# Patient Record
Sex: FEMALE | Race: WHITE
[De-identification: names, ages, dates, MRNs, and addresses within clinical notes are randomized per-mention and may not be internally consistent; named-entity substitution may affect disease eponyms.]

---

## 2020-03-04 ENCOUNTER — HOSPITAL ENCOUNTER (EMERGENCY)
Dept: HOSPITAL 56 - MW.ED | Age: 40
Discharge: HOME | End: 2020-03-04
Payer: COMMERCIAL

## 2020-03-04 VITALS — DIASTOLIC BLOOD PRESSURE: 80 MMHG | SYSTOLIC BLOOD PRESSURE: 114 MMHG | HEART RATE: 71 BPM

## 2020-03-04 DIAGNOSIS — R00.0: ICD-10-CM

## 2020-03-04 DIAGNOSIS — B34.9: Primary | ICD-10-CM

## 2020-03-04 DIAGNOSIS — F17.210: ICD-10-CM

## 2020-03-04 DIAGNOSIS — J06.9: ICD-10-CM

## 2020-03-04 LAB
BUN SERPL-MCNC: 9 MG/DL (ref 7–18)
CHLORIDE SERPL-SCNC: 105 MMOL/L (ref 98–107)
CO2 SERPL-SCNC: 23.8 MMOL/L (ref 21–32)
GLUCOSE SERPL-MCNC: 92 MG/DL (ref 74–106)
POTASSIUM SERPL-SCNC: 3.8 MMOL/L (ref 3.5–5.1)
SODIUM SERPL-SCNC: 139 MMOL/L (ref 136–145)

## 2020-03-04 PROCEDURE — 96374 THER/PROPH/DIAG INJ IV PUSH: CPT

## 2020-03-04 PROCEDURE — 71046 X-RAY EXAM CHEST 2 VIEWS: CPT

## 2020-03-04 PROCEDURE — 99285 EMERGENCY DEPT VISIT HI MDM: CPT

## 2020-03-04 PROCEDURE — 80053 COMPREHEN METABOLIC PANEL: CPT

## 2020-03-04 PROCEDURE — 82550 ASSAY OF CK (CPK): CPT

## 2020-03-04 PROCEDURE — 85379 FIBRIN DEGRADATION QUANT: CPT

## 2020-03-04 PROCEDURE — 84484 ASSAY OF TROPONIN QUANT: CPT

## 2020-03-04 PROCEDURE — 84703 CHORIONIC GONADOTROPIN ASSAY: CPT

## 2020-03-04 PROCEDURE — 87804 INFLUENZA ASSAY W/OPTIC: CPT

## 2020-03-04 PROCEDURE — 96361 HYDRATE IV INFUSION ADD-ON: CPT

## 2020-03-04 PROCEDURE — 93005 ELECTROCARDIOGRAM TRACING: CPT

## 2020-03-04 PROCEDURE — 85025 COMPLETE CBC W/AUTO DIFF WBC: CPT

## 2020-03-04 NOTE — EDM.PDOC
ED HPI GENERAL MEDICAL PROBLEM





- General


Chief Complaint: General


Stated Complaint: VIRUS


Time Seen by Provider: 03/04/20 17:41





- History of Present Illness


INITIAL COMMENTS - FREE TEXT/NARRATIVE: 





HPI


39-year-old female smoker presents for evaluation of cough, subjective 

shortness of breath, and subjective fevers that is been present for 

approximately 3 days and occurred after recent trip to [place] (where 2019-nCoV 

is tentatively believed to be endemic), denies history of DVT, PE, recent calf 

tenderness or swelling, notes that she took NSAIDs approximately 3 hours prior 

to presentation. Patient is concerned that she has COVID-19 and was sent by her 

boss for evaluation. Patient is currently taking penicillin for a dental 

abscess.





M/S/F/SocHx notable for: please see HPI; remainder reviewed with patient and in 

chart. 





ROS: Negative constitutional, eye, cardiovascular, pulmonary, GI, , MSK, skin

, neurologic, psychiatric, endocrine unless noted in the HPI.





Exam


, RR 18, /103, T 36.3C, SaO2 9 & room air.


Gen: pleasant, nontoxic-appearing, appears to be mildly uncomfortable.


HEENT: NC, AT, PEERL, EOMI.


Resp: Clear to auscultation bilaterally, normal work of breathing, no accessory 

muscle usage.


Card: Regular rate and rhythm with no murmurs, rubs, or gallops, extremities 

warm and well perfused. 


GI: nontender to palpation, no rebound, no guarding.


: No suprapubic tenderness to palpation.


MSK: No visible deformities, strength and tone without visually appreciable 

deficit.


Skin: Normal color with no visible lesions.


Neuro: alert and oriented  3, no facial asymmetry, vision and hearing WNL.


Psych: Mood and affect appropriate.





Labs / Imaging:


EKG: SR 69 bpm, no HI segment depressions,  ms, QRS 12 ms, no ST segment 

elevations or depressions, no discordant T wave inversions, QTc 403 ms..


CBC, CMP, CK, d-dimer, troponin, influenza A & B, and hCG pending.


CXR: pending. 





MDM


Previous chart, nursing note, labs, imaging, and vitals reviewed. 


A: 39-year-old female smoker presents for evaluation of cough, subjective 

shortness of breath, and subjective fevers that is been present for 

approximately 3 days and occurred after recent trip to [place] (where 2019-nCo 

is tentatively believed to be endemic), denies history of DVT, PE, recent calf 

tenderness or swelling, notes that she took NSAIDs approximately 3 hours prior 

to presentation.





DDx: URI (viral versus bacterial), dehydration, electrolyte abnormalities, 

pericarditis, myocarditis, PE, pericardial effusion, pleural effusion, pneumonia

, occult septicemia.





Evaluation: patient with a broad differential but clinically with her upper 

respiratory tract infection as the most likely diagnosis. Suspect tachycardia 

secondary to dehydration, however this is tentative pending completion of 

evaluation.





ED Course: 1 L NS and 15 mg Toradol given for symptomatic treatment of myalgias 

as well as suspected dehydration.





Disposition: care transferred to the [facility] overnight provider.





Impression: cough, malaise, tachycardia.





- Related Data


 Allergies











Allergy/AdvReac Type Severity Reaction Status Date / Time


 


No Known Allergies Allergy   Verified 03/04/20 17:46











Home Meds: 


 Home Meds





. [No Known Home Meds]  03/04/20 [History]











Past Medical History


HEENT History: Reports: None


Cardiovascular History: Reports: Other (See Below)


Other Cardiovascular History: mitral valve prolapse


Respiratory History: Reports: None


Gastrointestinal History: Reports: None


Genitourinary History: Reports: None


OB/GYN History: Reports: None


Musculoskeletal History: Reports: None


Neurological History: Reports: None


Psychiatric History: Reports: None


Endocrine/Metabolic History: Reports: None


Hematologic History: Reports: None


Immunologic History: Reports: None


Oncologic (Cancer) History: Reports: None


Dermatologic History: Reports: None





- Infectious Disease History


Infectious Disease History: Reports: None





- Past Surgical History


Head Surgeries/Procedures: Reports: None


HEENT Surgical History: Reports: Oral Surgery, Tonsillectomy


Cardiovascular Surgical History: Reports: None


Respiratory Surgical History: Reports: None


GI Surgical History: Reports: None


Female  Surgical History: Reports: None


Endocrine Surgical History: Reports: None


Neurological Surgical History: Reports: None


Musculoskeletal Surgical History: Reports: None


Oncologic Surgical History: Reports: None


Dermatological Surgical History: Reports: None





Social & Family History





- Family History


Family Medical History: Noncontributory





- Tobacco Use


Smoking Status *Q: Current Every Day Smoker


Years of Tobacco use: 30


Packs/Tins Daily: 0.5


Second Hand Smoke Exposure: No





- Caffeine Use


Caffeine Use: Reports: Coffee





- Recreational Drug Use


Recreational Drug Use: Yes


Recreational Drug Type: Reports: Marijuana/Hashish


Recreational Drug Use Frequency: Binges





ED ROS GENERAL





- Review of Systems


Review Of Systems: See Below





ED EXAM, GENERAL





- Physical Exam


Exam: See Below





Course





- Vital Signs


Last Recorded V/S: 


 Last Vital Signs











Temp  36.1 C   03/04/20 18:40


 


Pulse  71   03/04/20 18:40


 


Resp  16   03/04/20 18:40


 


BP  114/80   03/04/20 18:40


 


Pulse Ox  100   03/04/20 18:40














- Orders/Labs/Meds


Orders: 


 Active Orders 24 hr











 Category Date Time Status


 


 EKG 12 Lead [EKG Documentation Completion] [RC] STAT Care  03/04/20 18:02 

Active


 


 CMP [COMPREHENSIVE METABOLIC PN,CMP] [CHEM] Stat Lab  03/04/20 18:35 Received


 


 CREATINE KINASE,CK [CHEM] Stat Lab  03/04/20 18:35 Received


 


 D Dimer [D-DIMER QUANTITATIVE] [COAG] Stat Lab  03/04/20 18:35 Received


 


 HCG QUALITATIVE,SERUM [CHEM] Stat Lab  03/04/20 18:35 Received


 


 INFLUENZA A+B AG SCREEN [RM] Stat Lab  03/04/20 18:35 Received


 


 TROPONIN I [CHEM] Stat Lab  03/04/20 18:35 Received











Labs: 


 Laboratory Tests











  03/04/20 Range/Units





  18:35 


 


WBC  7.83  (4.0-11.0)  K/uL


 


RBC  4.68  (4.30-5.90)  M/uL


 


Hgb  15.0  (12.0-16.0)  g/dL


 


Hct  44.3  (36.0-46.0)  %


 


MCV  94.7  (80.0-98.0)  fL


 


MCH  32.1 H  (27.0-32.0)  pg


 


MCHC  33.9  (31.0-37.0)  g/dL


 


RDW Std Deviation  41.9  (28.0-62.0)  fl


 


RDW Coeff of Arnoldo  12  (11.0-15.0)  %


 


Plt Count  277  (150-400)  K/uL


 


MPV  9.80  (7.40-12.00)  fL


 


Neut % (Auto)  52.0  (48.0-80.0)  %


 


Lymph % (Auto)  36.3  (16.0-40.0)  %


 


Mono % (Auto)  9.5  (0.0-15.0)  %


 


Eos % (Auto)  1.8  (0.0-7.0)  %


 


Baso % (Auto)  0.4  (0.0-1.5)  %


 


Neut # (Auto)  4.1  (1.4-5.7)  K/uL


 


Lymph # (Auto)  2.8 H  (0.6-2.4)  K/uL


 


Mono # (Auto)  0.7  (0.0-0.8)  K/uL


 


Eos # (Auto)  0.1  (0.0-0.7)  K/uL


 


Baso # (Auto)  0.0  (0.0-0.1)  K/uL


 


Nucleated RBC %  0.0  /100WBC


 


Nucleated RBCs #  0  K/uL











Meds: 


Medications














Discontinued Medications














Generic Name Dose Route Start Last Admin





  Trade Name Freq  PRN Reason Stop Dose Admin


 


Sodium Chloride  1,000 mls @ 1,000 mls/hr  03/04/20 18:03  03/04/20 18:37





  Normal Saline  IV  03/04/20 19:02  1,000 mls/hr





  .Bolus ONE   Administration





     





     





     





     


 


Ketorolac Tromethamine  15 mg  03/04/20 18:03  03/04/20 18:37





  Toradol  IVPUSH  03/04/20 18:04  15 mg





  ONETIME ONE   Administration





     





     





     





     














Departure





- Departure


Time of Disposition: 19:05


Disposition: Still A Patient 30


Clinical Impression: 


 URI (upper respiratory infection)








- Discharge Information


Referrals: 


PCP,None [Primary Care Provider] - 


Forms:  ED Department Discharge


Additional Instructions: 


You were in seen in the [facility] 

Emergency Department for evaluation of a cough and feeling unwell. At time of 

your evaluation you are suspected have an upper respiratory tract infection 

likely caused by a virus. Please stay well-hydrated, please use ibuprofen and 

acetaminophen as directed below for treatment of discomfort. Please remain at 

home for 14 days until your symptoms are fully resolved. Please read and follow 

all of the instructions below.





Please follow up with your primary care physician as needed. When calling for 

follow-up care, please make the office aware that this follow-up is from your 

recent emergency room visit. If for any reason you are refused follow-up, 

please contact the [facility] Emergency 

Department at [phone] and asked to speak to the emergency department 

charge nurse. 





Your care today was limited to identifying and treating emergent medical 

problems only. Many people have subtle differences in their test results that 

require follow up with their outpatient physician(s) to correctly determine if 

this represents a normal variation or concerning abnormality with respect to 

your specific health. The care given to you today was limited to identifying 

and treating emergent medical problems - you need to request a copy of all of 

your medical records from today's visit and follow up with your outpatient 

physician(s) to review both today's visit and your overall health. If you have 

any new symptoms or if you are at all concerned about your health please return 

immediately to the emergency department.





Prescriptions:


If you are uninsured or have financial difficulties with filling your 

prescription(s), you may consider using a free pharmacy discount service such 

as [employer] ([employer]) or [employer] (wali). These services allow 

you to search for a medication on your phone (or computer) and obtain a coupon 

that usually has a significant discount from the list price at a pharmacy. Your 

physician as well as [facility] does not have a financial 

relationship with either of these services. You may also wish to speak with 

your physician to determine if lower cost prescriptions are possible.





Obtaining primary care:


1.   [facility] provides pediatrics (children), 

family medicine (children, adults, and some obstetrical care), and internal 

medicine (adults). Further specialty care is also available. Same day 

appointments are available. They may be contacted at [phone] and are open 

Monday through Friday 8 AM to 5 PM. The [facility] are 

located at [facility], [address] [place] [address].


2.   [facility] offers family medicine, internal 

medicine, womens health, and further specialty care. [facility] 

may be contacted at [phone]. [facility] is 

located at [address] [place] [address], [address].


3.   If you have health insurance, please also contact your insurer for a list 

of accepting providers under your policy, you may contact these providers for 

further health care.





Occupational health:


Work related injuries may consider following up with [facility], [phone]. Occupational health services are located at [address] [place] [address] and are open Monday through Friday from 7:

30 am to 5:00 pm.





Obstetrical and Gynecological Care:


[facility], [phone], Monday through Friday 8 AM to 

5 PM. [address] [address].





Eyecare:


If you have an eye injury you should follow up with your ophthalmologist or 

with [facility], at [phone] or [phone]

, they are located at [address] [place] [address].





Dental Care 


   [facility]. [address] [place], [place]. Ph. [phone]


   Swapnil [facility]. [address] [facility] [address], [place], [place]. Ph. [phone]


   Goyo [facility]ANT [facility]. [address] [place] [address]. Ph. [phone]


   Yaniv Marcus [facility]. [address] [facility] [address] [place]. Ph. [phone]


   [facility] [address] [facility] [address] [place], [place]. Ph. [phone]


   Bhakti GAMA [facility]. [address] [place] [address]. Ph. [phone]


   [facility]. [address] [place], [place]. Ph. [phone]


   [facility]. [address], [place], [place]. 

Ph. [phone]


   [facility] [address] [place] [address]. Ph. [phone]


   [facility]. [address] [place] [address] [address] [place], [place]. Ph. [phone]


[facility]. [address] [place], [place]. Ph. [phone]


Viral Syndrome


You are believed to have a viral infection of the respiratory tract. These 

infections may cause chills, fever, cough, headache, body aches, and sore 

throat. Depending upon the virus, you may have mild to more severe symptoms. 

The worst symptoms typically last a 2-5 days. Cough and fatigue may continue 

for as long as 7 to 10 days. Viral respiratory infections are highly 

contagious. Symptoms will not be reduced or improved by taking an antibiotic. 

Antibiotics are medications that kill bacteria, not viruses. Rarely these 

infections can lead to an infection of the sinuses, lungs, or middle ear. 

However antibiotics at this point in your illness antibiotics will not help 

prevent these uncommon complications. 





Home Care Instructions: 


* Care for viral infections will not shorten your illness but can help reduce 

your symptoms.


* Please stay well hydrated and attempt to get plently of sleep.


* You may take both ibuprofen and acetaminophen as directed on the bottle for 

relief of fever, chills, and muscle aches. 


* If you have a severe cough you may take an over-the-counter cough medication 

containing dextromethorphan.


* Continue to cover your cough and wash your hands often.


* Read the package instructions and warnings on any medication that you are 

taking.





Return to the Emergency Department if you have:


* Fast breathing, trouble breathing, or shortness of breath


* Bluish or gray skin color 


* Not drinking enough fluids 


* Severe or persistent vomiting 


* Not waking up or not interacting 


* Pain or pressure in the chest or abdomen 


* Sudden dizziness 


* Confusion 


* Or if you are otherwise concerned about your health





Please follow-up your primary care provider or return to the emergency 

department if:


* Your symptoms fail to improve over the next 4-5 days.


* Your symptoms improve but then significantly worsen - this may be a sign of a 

bacterial infection which will need to be treated.


You are otherwise concerned about your health.


You make take over the counter Acetaminophen (Tylenol) and Ibuprofen (Motrin or 

Aleve) as directed below for relief of pain. 


   Take 600 mg of ibuprofen (three 200 mg tablets) with a glass of water every 

6-8 hours as needed for pain or fever. Do not take if you have ulcers, GI 

bleeding, are pregnant, or are allergic to ibuprofen.


   Take 1,000 mg of acetaminophen (two 500 mg tablets) with a glass of water 

every 6-8 hours as needed for pain. Do not take if you are allergic to 

acetaminophen. If you have liver disease, please reduce your dose to a maximum 

of 2,000 mg per day.


   You can take these medications at the same time or on separate schedules. 


   Do not take for more than 10 days.


   Do not take with alcohol or other acetaminophen containing medications.  


   This medication may cause a mildly upset stomach, if so take it with a 

small snack. Stop taking it if you have persistent abdominal pain, heartburn, 

or any stomach pain. Do not take this medication if you have known ulcers. 


   Please read the warnings at the end of this document regarding these 

medications. 





IBUPROFEN WARNING: This drug may infrequently cause serious (rarely fatal) 

bleeding from the stomach or intestines. Also, related drugs rarely have caused 

blood clots to form, resulting in heart attacks and strokes. This medication 

might also rarely cause similar problems. Talk to your doctor or pharmacist 

about the benefits and risks of treatment, as well as other possible medication 

choices. If you notice any of the following rare but very serious side effects, 

stop taking ibuprofen and seek immediate medical attention: black stools, 

persistent stomach/abdominal pain, vomit that looks like coffee grounds, chest 

pain, weakness on one side of the body, sudden vision changes, slurred speech. 





IBUPROFEN SIDE EFFECTS: Upset stomach, nausea, vomiting, heartburn, headache, 

diarrhea, constipation, drowsiness, and dizziness may occur. If any of these 

effects persist or worsen, notify your doctor or pharmacist promptly. If your 

doctor has directed you to use this medication, remember that he or she has 

judged that the benefit to you is greater than the risk of side effects. Many 

people using this medication do not have serious side effects. Tell your doctor 

immediately if any of these serious side effects occur: stomach pain, swelling 

of the hands or feet, sudden or unexplained weight gain, ringing in the ears (

tinnitus). Tell your doctor immediately if any of these unlikely but serious 

side effects occur: vision changes, rapid or pounding heartbeat, easy bruising 

or bleeding, difficult/painful swallowing. Tell your doctor immediately if any 

of these highly unlikely but very serious side effects occur: change in amount 

of urine, severe headache, very stiff neck, mental/mood changes, persistent 

sore throat or fever. This drug may rarely cause serious (possibly fatal) liver 

disease. If you notice any of the following highly unlikely but very serious 

side effects, stop taking ibuprofen and consult your doctor or pharmacist 

immediately: yellowing eyes and skin, dark urine, unusual/extreme tiredness. An 

allergic reaction to this drug is unlikely, but seek immediate medical 

attention if it occurs. Symptoms of an allergic reaction include: rash, itching/

swelling (especially of the face/tongue/throat), severe dizziness, trouble 

breathing. This is not a complete list of possible side effects. 





ACETAMINOPHEN SIDE EFFECTS: This drug usually has no side effects. If you do 

not have liver problems, the maximum dose of acetaminophen for adults is 4 

grams per day (4000 milligrams). Taking more than the maximum daily amount may 

cause serious (possibly fatal) liver damage. Get medical help right away if you 

have any of the following symptoms of liver damage: persistent nausea/vomiting, 

extreme tiredness, stomach/abdominal pain, yellowing eyes/skin, dark urine. If 

you have liver problems, consult your doctor or pharmacist for a safe dosage of 

this medication. A very serious allergic reaction to this drug is rare. However

, get medical help right away if you notice any symptoms of a serious allergic 

reaction, including: rash, itching/swelling (especially of the face/tongue/

throat), severe dizziness, trouble breathing. This is not a complete list of 

possible side effects. If you notice other effects not listed above, contact 

your doctor or pharmacist.





DRUG INTERACTIONS: Your healthcare professionals (e.g., doctor or pharmacist) 

may already be aware of any possible drug interactions and may be monitoring 

you for it. Do not start, stop or change the dosage of any medicine before 

checking with them first. This drug should not be used with the following 

medications because very serious interactions may occur: cidofovir, ketorolac. 

If you are currently using any of these medications listed above, tell your 

doctor or pharmacist before starting ibuprofen. Before using this medication, 

tell your doctor or pharmacist of all prescription and nonprescription/herbal 

products you may use, especially of: anti-platelet drugs (e.g., cilostazol, 

clopidogrel), oral bisphosphonates (e.g., alendronate), other medications for 

arthritis (e.g., aspirin, methotrexate), "blood thinners" (e.g., enoxaparin, 

heparin, warfarin), corticosteroids (e.g., prednisone), cyclosporine, 

desmopressin, high blood pressure drugs (including ACE inhibitors such as 

captopril, angiotensin II receptor antagonists such as losartan, and beta-

blockers such as metoprolol), lithium, pemetrexed, "water pills" (diuretics 

such as furosemide, hydrochlorothiazide, triamterene). Check all prescription 

and nonprescription medicine labels carefully for other pain/fever drugs (

NSAIDs such as aspirin, celecoxib, naproxen). These drugs are similar to 

ibuprofen, so taking one of these drugs while also taking ibuprofen may 

increase your risk of side effects. Consult your doctor or pharmacist for more 

details. However, if your doctor has prescribed low doses of aspirin to prevent 

heart attack or stroke (usually at dosages of  milligrams a day), you 

should continue to take the aspirin. Daily use of ibuprofen may decrease aspirin

's ability to prevent heart attack/stroke. Talk to your doctor about using a 

different medication (e.g., acetaminophen) to treat pain/fever. If you must 

take ibuprofen, talk to your doctor about possibly taking immediate-release 

aspirin (not enteric-coated) while also taking the ibuprofen dose apart from 

your aspirin dose. Do not increase your daily dose of aspirin or change the way 

you take aspirin/other medications without your doctor's approval. This 

document does not contain all possible interactions. Therefore, before using 

this product, tell your doctor or pharmacist of all the products you use. Keep 

a list of all your medications with you, and share the list with your doctor 

and pharmacist. 





Sepsis Event Note





- Evaluation


Sepsis Screening Result: No Definite Risk





- Focused Exam


Vital Signs: 


 Vital Signs











  Temp Pulse Resp BP Pulse Ox


 


 03/04/20 18:40  36.1 C  71  16  114/80  100


 


 03/04/20 17:44  36.3 C  131 H  18  143/103 H  99











Date Exam was Performed: 03/04/20


Time Exam was Performed: 19:05





- My Orders


Last 24 Hours: 


My Active Orders





03/04/20 18:02


EKG 12 Lead [EKG Documentation Completion] [RC] STAT 





03/04/20 18:35


CMP [COMPREHENSIVE METABOLIC PN,CMP] [CHEM] Stat 


CREATINE KINASE,CK [CHEM] Stat 


D Dimer [D-DIMER QUANTITATIVE] [COAG] Stat 


HCG QUALITATIVE,SERUM [CHEM] Stat 


INFLUENZA A+B AG SCREEN [RM] Stat 


TROPONIN I [CHEM] Stat 














- Assessment/Plan


Last 24 Hours: 


My Active Orders





03/04/20 18:02


EKG 12 Lead [EKG Documentation Completion] [RC] STAT 





03/04/20 18:35


CMP [COMPREHENSIVE METABOLIC PN,CMP] [CHEM] Stat 


CREATINE KINASE,CK [CHEM] Stat 


D Dimer [D-DIMER QUANTITATIVE] [COAG] Stat 


HCG QUALITATIVE,SERUM [CHEM] Stat 


INFLUENZA A+B AG SCREEN [RM] Stat 


TROPONIN I [CHEM] Stat

## 2020-03-04 NOTE — CR
Chest: 2 views of the chest were obtained.

 

Comparison: Prior chest x-ray of 02/04/14.

 

Heart size and mediastinum are normal.  Lungs are clear with no acute 

parenchymal change.  Minimal scoliosis is noted.  Plate and screws are

 partially visualized within the right humerus.

 

Nodular density noted within the right upper chest measuring 2.6 cm.  

This is not identified on prior exam but is most likely due to 

external artifact.

 

Impression:

1.  Findings as noted above.

2.  Nothing acute is appreciated on 2 view chest x-ray.

 

Diagnostic code #2

 

Study was dictated in [place] Standard Time

## 2020-03-04 NOTE — EDM.PDOC
ED HPI GENERAL MEDICAL PROBLEM





- General


Chief Complaint: General


Stated Complaint: VIRUS


Time Seen by Provider: 03/04/20 17:41





- History of Present Illness


INITIAL COMMENTS - FREE TEXT/NARRATIVE: 





HPI


39-year-old female smoker presents for evaluation of cough, subjective 

shortness of breath, and subjective fevers that is been present for 

approximately 3 days and occurred after recent trip to [place] (where 2019-nCoV 

is tentatively believed to be endemic), denies history of DVT, PE, recent calf 

tenderness or swelling, notes that she took NSAIDs approximately 3 hours prior 

to presentation. Patient is concerned that she has COVID-19 and was sent by her 

boss for evaluation. Patient is currently taking penicillin for a dental 

abscess.





M/S/F/SocHx notable for: please see HPI; remainder reviewed with patient and in 

chart. 





ROS: Negative constitutional, eye, cardiovascular, pulmonary, GI, , MSK, skin

, neurologic, psychiatric, endocrine unless noted in the HPI.





Exam


, RR 18, /103, T 36.3C, SaO2 9 & room air.


Gen: pleasant, nontoxic-appearing, appears to be mildly uncomfortable.


HEENT: NC, AT, PEERL, EOMI.


Resp: Clear to auscultation bilaterally, normal work of breathing, no accessory 

muscle usage.


Card: Regular rate and rhythm with no murmurs, rubs, or gallops, extremities 

warm and well perfused. 


GI: nontender to palpation, no rebound, no guarding.


: No suprapubic tenderness to palpation.


MSK: No visible deformities, strength and tone without visually appreciable 

deficit.


Skin: Normal color with no visible lesions.


Neuro: alert and oriented  3, no facial asymmetry, vision and hearing WNL.


Psych: Mood and affect appropriate.





Labs / Imaging:


EKG: SR 69 bpm, no IN segment depressions,  ms, QRS 12 ms, no ST segment 

elevations or depressions, no discordant T wave inversions, QTc 403 ms..


CBC, CMP, CK, d-dimer, troponin, influenza A & B, and hCG pending.


CXR: pending. 





MDM


Previous chart, nursing note, labs, imaging, and vitals reviewed. 


A: 39-year-old female smoker presents for evaluation of cough, subjective 

shortness of breath, and subjective fevers that is been present for 

approximately 3 days and occurred after recent trip to [place] (where 2019-nCo 

is tentatively believed to be endemic), denies history of DVT, PE, recent calf 

tenderness or swelling, notes that she took NSAIDs approximately 3 hours prior 

to presentation.





DDx: URI (viral versus bacterial), dehydration, electrolyte abnormalities, 

pericarditis, myocarditis, PE, pericardial effusion, pleural effusion, pneumonia

, occult septicemia.





Evaluation: patient with a broad differential but clinically with her upper 

respiratory tract infection as the most likely diagnosis. Suspect tachycardia 

secondary to dehydration, however this is tentative pending completion of 

evaluation.





ED Course: 1 L NS and 15 mg Toradol given for symptomatic treatment of myalgias 

as well as suspected dehydration.





Disposition: care transferred to the [facility] overnight provider.





Impression: cough, malaise, tachycardia.





- Related Data


 Allergies











Allergy/AdvReac Type Severity Reaction Status Date / Time


 


No Known Allergies Allergy   Verified 03/04/20 17:46











Home Meds: 


 Home Meds





. [No Known Home Meds]  03/04/20 [History]











Past Medical History


HEENT History: Reports: None


Cardiovascular History: Reports: Other (See Below)


Other Cardiovascular History: mitral valve prolapse


Respiratory History: Reports: None


Gastrointestinal History: Reports: None


Genitourinary History: Reports: None


OB/GYN History: Reports: None


Musculoskeletal History: Reports: None


Neurological History: Reports: None


Psychiatric History: Reports: None


Endocrine/Metabolic History: Reports: None


Hematologic History: Reports: None


Immunologic History: Reports: None


Oncologic (Cancer) History: Reports: None


Dermatologic History: Reports: None





- Infectious Disease History


Infectious Disease History: Reports: None





- Past Surgical History


Head Surgeries/Procedures: Reports: None


HEENT Surgical History: Reports: Oral Surgery, Tonsillectomy


Cardiovascular Surgical History: Reports: None


Respiratory Surgical History: Reports: None


GI Surgical History: Reports: None


Female  Surgical History: Reports: None


Endocrine Surgical History: Reports: None


Neurological Surgical History: Reports: None


Musculoskeletal Surgical History: Reports: None


Oncologic Surgical History: Reports: None


Dermatological Surgical History: Reports: None





Social & Family History





- Family History


Family Medical History: Noncontributory





- Tobacco Use


Smoking Status *Q: Current Every Day Smoker


Years of Tobacco use: 30


Packs/Tins Daily: 0.5


Second Hand Smoke Exposure: No





- Caffeine Use


Caffeine Use: Reports: Coffee





- Recreational Drug Use


Recreational Drug Use: Yes


Recreational Drug Type: Reports: Marijuana/Hashish


Recreational Drug Use Frequency: Binges





ED ROS GENERAL





- Review of Systems


Review Of Systems: See Below (Please see original H&P)





ED EXAM, GENERAL





- Physical Exam


Exam: See Below (Original H&P)


Free Text/Narrative:: 





HPI


39-year-old female smoker presents for evaluation of cough, subjective 

shortness of breath, and subjective fevers that is been present for 

approximately 3 days and occurred after recent trip to [place] (where [facility] 

[place] tentatively believed to be endemic), denies history of DVT, PE, recent calf 

tenderness or swelling, notes that she took NSAIDs approximately 3 hours prior 

to presentation. Patient is concerned that she has COVID-19 and was sent by her 

boss for evaluation. Patient is currently taking penicillin for a dental 

abscess.





M/S/F/SocHx notable for: please see HPI; remainder reviewed with patient and in 

chart. 





ROS: Negative constitutional, eye, cardiovascular, pulmonary, GI, , MSK, skin

, neurologic, psychiatric, endocrine unless noted in the HPI.





Exam


, RR 18, /103, T 36.3C, SaO2 9 & room air.


Gen: pleasant, nontoxic-appearing, appears to be mildly uncomfortable.


HEENT: NC, AT, PEERL, EOMI.


Resp: Clear to auscultation bilaterally, normal work of breathing, no accessory 

muscle usage.


Card: Regular rate and rhythm with no murmurs, rubs, or gallops, extremities 

warm and well perfused. 


GI: nontender to palpation, no rebound, no guarding.


: No suprapubic tenderness to palpation.


MSK: No visible deformities, strength and tone without visually appreciable 

deficit.


Skin: Normal color with no visible lesions.


Neuro: alert and oriented  3, no facial asymmetry, vision and hearing WNL.


Psych: Mood and affect appropriate.





Labs / Imaging:


EKG: SR 69 bpm, no IN segment depressions,  ms, QRS 12 ms, no ST segment 

elevations or depressions, no discordant T wave inversions, QTc 403 ms..


CBC, CMP, CK, d-dimer, troponin, influenza A & B, and hCG pending.


CXR: pending. 





MDM


Previous chart, nursing note, labs, imaging, and vitals reviewed. 


A: 39-year-old female smoker presents for evaluation of cough, subjective 

shortness of breath, and subjective fevers that is been present for 

approximately 3 days and occurred after recent trip to [place] (where 2019-nCoV 

is tentatively believed to be endemic), denies history of DVT, PE, recent calf 

tenderness or swelling, notes that she took NSAIDs approximately 3 hours prior 

to presentation.





DDx: URI (viral versus bacterial), dehydration, electrolyte abnormalities, 

pericarditis, myocarditis, PE, pericardial effusion, pleural effusion, pneumonia

, occult septicemia.





Evaluation: patient with a broad differential but clinically with her upper 

respiratory tract infection as the most likely diagnosis. Suspect tachycardia 

secondary to dehydration, however this is tentative pending completion of 

evaluation.





ED Course: 1 L NS and 15 mg Toradol given for symptomatic treatment of myalgias 

as well as suspected dehydration.





Disposition: care transferred to the oncoming overnight provider.





Impression: cough, malaise, tachycardia.





Course





- Vital Signs


Text/Narrative:: 


   The patient's care is transferred to me pending completion of her evaluation 

which includes a d-dimer.





Tests are unremarkable.


I went into the room and the patient is lounging on the cot with her legs 

crossed in her hands folded over her abdomen.  She is in no distress and I 

talked with her in detail about the diagnosis of a viral syndrome.  The patient 

states her understanding and she is stable for discharge.








Last Recorded V/S: 


 Last Vital Signs











Temp  36.1 C   03/04/20 18:40


 


Pulse  71   03/04/20 18:40


 


Resp  16   03/04/20 18:40


 


BP  114/80   03/04/20 18:40


 


Pulse Ox  100   03/04/20 18:40














- Orders/Labs/Meds


Orders: 


 Active Orders 24 hr











 Category Date Time Status


 


 EKG 12 Lead [EKG Documentation Completion] [RC] STAT Care  03/04/20 18:02 

Active











Labs: 


 Laboratory Tests











  03/04/20 03/04/20 03/04/20 Range/Units





  18:35 18:35 18:35 


 


WBC  7.83    (4.0-11.0)  K/uL


 


RBC  4.68    (4.30-5.90)  M/uL


 


Hgb  15.0    (12.0-16.0)  g/dL


 


Hct  44.3    (36.0-46.0)  %


 


MCV  94.7    (80.0-98.0)  fL


 


MCH  32.1 H    (27.0-32.0)  pg


 


MCHC  33.9    (31.0-37.0)  g/dL


 


RDW Std Deviation  41.9    (28.0-62.0)  fl


 


RDW Coeff of Arnoldo  12    (11.0-15.0)  %


 


Plt Count  277    (150-400)  K/uL


 


MPV  9.80    (7.40-12.00)  fL


 


Neut % (Auto)  52.0    (48.0-80.0)  %


 


Lymph % (Auto)  36.3    (16.0-40.0)  %


 


Mono % (Auto)  9.5    (0.0-15.0)  %


 


Eos % (Auto)  1.8    (0.0-7.0)  %


 


Baso % (Auto)  0.4    (0.0-1.5)  %


 


Neut # (Auto)  4.1    (1.4-5.7)  K/uL


 


Lymph # (Auto)  2.8 H    (0.6-2.4)  K/uL


 


Mono # (Auto)  0.7    (0.0-0.8)  K/uL


 


Eos # (Auto)  0.1    (0.0-0.7)  K/uL


 


Baso # (Auto)  0.0    (0.0-0.1)  K/uL


 


Nucleated RBC %  0.0    /100WBC


 


Nucleated RBCs #  0    K/uL


 


D-Dimer, Quantitative   0.30   (0.0-0.50)  mg/L FEU


 


Sodium    139  (136-145)  mmol/L


 


Potassium    3.8  (3.5-5.1)  mmol/L


 


Chloride    105  ()  mmol/L


 


Carbon Dioxide    23.8  (21.0-32.0)  mmol/L


 


BUN    9  (7.0-18.0)  mg/dL


 


Creatinine    0.9  (0.6-1.0)  mg/dL


 


Est Cr Clr Drug Dosing    79.99  mL/min


 


Estimated GFR (MDRD)    > 60.0  ml/min


 


Glucose    92  ()  mg/dL


 


Calcium    8.9  (8.5-10.1)  mg/dL


 


Total Bilirubin    0.3  (0.2-1.0)  mg/dL


 


AST    22  (15-37)  IU/L


 


ALT    30  (14-63)  IU/L


 


Alkaline Phosphatase    70  ()  U/L


 


Creatine Kinase     ()  U/L


 


Troponin I    < 0.050  (0.000-0.056)  ng/mL


 


Total Protein    6.9  (6.4-8.2)  g/dL


 


Albumin    3.9  (3.4-5.0)  g/dL


 


Globulin    3.0  (2.6-4.0)  g/dL


 


Albumin/Globulin Ratio    1.3  (0.9-1.6)  


 


HCG, Qual     (NEG)  














  03/04/20 03/04/20 Range/Units





  18:35 18:35 


 


WBC    (4.0-11.0)  K/uL


 


RBC    (4.30-5.90)  M/uL


 


Hgb    (12.0-16.0)  g/dL


 


Hct    (36.0-46.0)  %


 


MCV    (80.0-98.0)  fL


 


MCH    (27.0-32.0)  pg


 


MCHC    (31.0-37.0)  g/dL


 


RDW Std Deviation    (28.0-62.0)  fl


 


RDW Coeff of Arnoldo    (11.0-15.0)  %


 


Plt Count    (150-400)  K/uL


 


MPV    (7.40-12.00)  fL


 


Neut % (Auto)    (48.0-80.0)  %


 


Lymph % (Auto)    (16.0-40.0)  %


 


Mono % (Auto)    (0.0-15.0)  %


 


Eos % (Auto)    (0.0-7.0)  %


 


Baso % (Auto)    (0.0-1.5)  %


 


Neut # (Auto)    (1.4-5.7)  K/uL


 


Lymph # (Auto)    (0.6-2.4)  K/uL


 


Mono # (Auto)    (0.0-0.8)  K/uL


 


Eos # (Auto)    (0.0-0.7)  K/uL


 


Baso # (Auto)    (0.0-0.1)  K/uL


 


Nucleated RBC %    /100WBC


 


Nucleated RBCs #    K/uL


 


D-Dimer, Quantitative    (0.0-0.50)  mg/L FEU


 


Sodium    (136-145)  mmol/L


 


Potassium    (3.5-5.1)  mmol/L


 


Chloride    ()  mmol/L


 


Carbon Dioxide    (21.0-32.0)  mmol/L


 


BUN    (7.0-18.0)  mg/dL


 


Creatinine    (0.6-1.0)  mg/dL


 


Est Cr Clr Drug Dosing    mL/min


 


Estimated GFR (MDRD)    ml/min


 


Glucose    ()  mg/dL


 


Calcium    (8.5-10.1)  mg/dL


 


Total Bilirubin    (0.2-1.0)  mg/dL


 


AST    (15-37)  IU/L


 


ALT    (14-63)  IU/L


 


Alkaline Phosphatase    ()  U/L


 


Creatine Kinase   53  ()  U/L


 


Troponin I    (0.000-0.056)  ng/mL


 


Total Protein    (6.4-8.2)  g/dL


 


Albumin    (3.4-5.0)  g/dL


 


Globulin    (2.6-4.0)  g/dL


 


Albumin/Globulin Ratio    (0.9-1.6)  


 


HCG, Qual  NEGATIVE   (NEG)  











Meds: 


Medications














Discontinued Medications














Generic Name Dose Route Start Last Admin





  Trade Name Freq  PRN Reason Stop Dose Admin


 


Sodium Chloride  1,000 mls @ 1,000 mls/hr  03/04/20 18:03  03/04/20 18:37





  Normal Saline  IV  03/04/20 19:02  1,000 mls/hr





  .Bolus ONE   Administration





     





     





     





     


 


Ketorolac Tromethamine  15 mg  03/04/20 18:03  03/04/20 18:37





  Toradol  IVPUSH  03/04/20 18:04  15 mg





  ONETIME ONE   Administration





     





     





     





     














Departure





- Departure


Time of Disposition: 19:52


Disposition: Home, Self-Care 01


Condition: Good


Clinical Impression: 


 URI (upper respiratory infection), Viral syndrome








- Discharge Information


*PRESCRIPTION DRUG MONITORING PROGRAM REVIEWED*: No


*COPY OF PRESCRIPTION DRUG MONITORING REPORT IN PATIENT LAURIE: No


Instructions:  Viral Respiratory Infection, Easy-To-Read


Referrals: 


PCP,None [Primary Care Provider] - 


Forms:  ED Department Discharge


Additional Instructions: 


You were in seen in the [facility] 

Emergency Department for evaluation of a cough and feeling unwell. At time of 

your evaluation you are suspected have an upper respiratory tract infection 

likely caused by a virus. Please stay well-hydrated, please use ibuprofen and 

acetaminophen as directed below for treatment of discomfort. Please remain at 

home for 14 days until your symptoms are fully resolved. Please read and follow 

all of the instructions below.





Please follow up with your primary care physician as needed. When calling for 

follow-up care, please make the office aware that this follow-up is from your 

recent emergency room visit. If for any reason you are refused follow-up, 

please contact the [facility] Emergency 

Department at [phone] and asked to speak to the emergency department 

charge nurse. 





Your care today was limited to identifying and treating emergent medical 

problems only. Many people have subtle differences in their test results that 

require follow up with their outpatient physician(s) to correctly determine if 

this represents a normal variation or concerning abnormality with respect to 

your specific health. The care given to you today was limited to identifying 

and treating emergent medical problems - you need to request a copy of all of 

your medical records from today's visit and follow up with your outpatient 

physician(s) to review both today's visit and your overall health. If you have 

any new symptoms or if you are at all concerned about your health please return 

immediately to the emergency department.





Prescriptions:


If you are uninsured or have financial difficulties with filling your 

prescription(s), you may consider using a free pharmacy discount service such 

as [employer] ([employer]) or [employer] ([employer]). These services allow 

you to search for a medication on your phone (or computer) and obtain a coupon 

that usually has a significant discount from the list price at a pharmacy. Your 

physician as well as [facility] does not have a financial 

relationship with either of these services. You may also wish to speak with 

your physician to determine if lower cost prescriptions are possible.





Obtaining primary care:


1.   [facility] provides pediatrics (children), 

family medicine (children, adults, and some obstetrical care), and internal 

medicine (adults). Further specialty care is also available. Same day 

appointments are available. They may be contacted at [phone] and are open 

Monday through Friday 8 AM to 5 PM. The [facility] are 

located at [facility], [address] [place].


2.   [facility] offers family medicine, internal 

medicine, womens health, and further specialty care. [facility] 

may be contacted at [phone]. [facility] is 

located at [address] [place], [address].


3.   If you have health insurance, please also contact your insurer for a list 

of accepting providers under your policy, you may contact these providers for 

further health care.





Occupational health:


Work related injuries may consider following up with [facility], [phone]. Occupational health services are located at [address] [place] [address] and are open Monday through Friday from 7:

30 am to 5:00 pm.





Obstetrical and Gynecological Care:


[facility], [phone], Monday through Friday 8 AM to 

5 PM. [address] [place] [address].





Eyecare:


If you have an eye injury you should follow up with your ophthalmologist or 

with [facility], at [phone] or [phone]

, they are located at [address] [place] [address].





Dental Care 


   [facility]. [address] [place], [place]. Ph. [phone]


   Swapnil [facility]. [address] [facility] [address], [place], [place]. Ph. [phone]


   Goyo SAWANT [facility]. [address] [place] [address]. Ph. [phone]


   Yaniv Marcus [facility]. [address] [facility] [address] [place], [place]. Ph. [phone]


   [facility]. [address] [facility] [address] [place], [place]. Ph. [phone]


   Bhakti GAMA D[facility]. [address] [facility]. Ph. [phone]


   [facility]. [address] [place], [place]. Ph. [phone]


   [facility]. [address], [place], [place]. 

Ph. [phone]


   [facility] [address] [place] [address]. Ph. [phone]

[phone]


   Sincere [facility]. [address] [place] [address] 1. KRISTEL Maria. Ph. [phone]


[facility]. [address] [place] [address], KRISTEL Maria. Ph. [phone]


Viral Syndrome


You are believed to have a viral infection of the respiratory tract. These 

infections may cause chills, fever, cough, headache, body aches, and sore 

throat. Depending upon the virus, you may have mild to more severe symptoms. 

The worst symptoms typically last a 2-5 days. Cough and fatigue may continue 

for as long as 7 to 10 days. Viral respiratory infections are highly 

contagious. Symptoms will not be reduced or improved by taking an antibiotic. 

Antibiotics are medications that kill bacteria, not viruses. Rarely these 

infections can lead to an infection of the sinuses, lungs, or middle ear. 

However antibiotics at this point in your illness antibiotics will not help 

prevent these uncommon complications. 





Home Care Instructions: 


* Care for viral infections will not shorten your illness but can help reduce 

your symptoms.


* Please stay well hydrated and attempt to get plently of sleep.


* You may take both ibuprofen and acetaminophen as directed on the bottle for 

relief of fever, chills, and muscle aches. 


* If you have a severe cough you may take an over-the-counter cough medication 

containing dextromethorphan.


* Continue to cover your cough and wash your hands often.


* Read the package instructions and warnings on any medication that you are 

taking.





Return to the Emergency Department if you have:


* Fast breathing, trouble breathing, or shortness of breath


* Bluish or gray skin color 


* Not drinking enough fluids 


* Severe or persistent vomiting 


* Not waking up or not interacting 


* Pain or pressure in the chest or abdomen 


* Sudden dizziness 


* Confusion 


* Or if you are otherwise concerned about your health





Please follow-up your primary care provider or return to the emergency 

department if:


* Your symptoms fail to improve over the next 4-5 days.


* Your symptoms improve but then significantly worsen - this may be a sign of a 

bacterial infection which will need to be treated.


You are otherwise concerned about your health.


You make take over the counter Acetaminophen (Tylenol) and Ibuprofen (Motrin or 

Aleve) as directed below for relief of pain. 


   Take 600 mg of ibuprofen (three 200 mg tablets) with a glass of water every 

6-8 hours as needed for pain or fever. Do not take if you have ulcers, GI 

bleeding, are pregnant, or are allergic to ibuprofen.


   Take 1,000 mg of acetaminophen (two 500 mg tablets) with a glass of water 

every 6-8 hours as needed for pain. Do not take if you are allergic to 

acetaminophen. If you have liver disease, please reduce your dose to a maximum 

of 2,000 mg per day.


   You can take these medications at the same time or on separate schedules. 


   Do not take for more than 10 days.


   Do not take with alcohol or other acetaminophen containing medications.  


   This medication may cause a mildly upset stomach, if so take it with a 

small snack. Stop taking it if you have persistent abdominal pain, heartburn, 

or any stomach pain. Do not take this medication if you have known ulcers. 


   Please read the warnings at the end of this document regarding these 

medications. 





IBUPROFEN WARNING: This drug may infrequently cause serious (rarely fatal) 

bleeding from the stomach or intestines. Also, related drugs rarely have caused 

blood clots to form, resulting in heart attacks and strokes. This medication 

might also rarely cause similar problems. Talk to your doctor or pharmacist 

about the benefits and risks of treatment, as well as other possible medication 

choices. If you notice any of the following rare but very serious side effects, 

stop taking ibuprofen and seek immediate medical attention: black stools, 

persistent stomach/abdominal pain, vomit that looks like coffee grounds, chest 

pain, weakness on one side of the body, sudden vision changes, slurred speech. 





IBUPROFEN SIDE EFFECTS: Upset stomach, nausea, vomiting, heartburn, headache, 

diarrhea, constipation, drowsiness, and dizziness may occur. If any of these 

effects persist or worsen, notify your doctor or pharmacist promptly. If your 

doctor has directed you to use this medication, remember that he or she has 

judged that the benefit to you is greater than the risk of side effects. Many 

people using this medication do not have serious side effects. Tell your doctor 

immediately if any of these serious side effects occur: stomach pain, swelling 

of the hands or feet, sudden or unexplained weight gain, ringing in the ears (

tinnitus). Tell your doctor immediately if any of these unlikely but serious 

side effects occur: vision changes, rapid or pounding heartbeat, easy bruising 

or bleeding, difficult/painful swallowing. Tell your doctor immediately if any 

of these highly unlikely but very serious side effects occur: change in amount 

of urine, severe headache, very stiff neck, mental/mood changes, persistent 

sore throat or fever. This drug may rarely cause serious (possibly fatal) liver 

disease. If you notice any of the following highly unlikely but very serious 

side effects, stop taking ibuprofen and consult your doctor or pharmacist 

immediately: yellowing eyes and skin, dark urine, unusual/extreme tiredness. An 

allergic reaction to this drug is unlikely, but seek immediate medical 

attention if it occurs. Symptoms of an allergic reaction include: rash, itching/

swelling (especially of the face/tongue/throat), severe dizziness, trouble 

breathing. This is not a complete list of possible side effects. 





ACETAMINOPHEN SIDE EFFECTS: This drug usually has no side effects. If you do 

not have liver problems, the maximum dose of acetaminophen for adults is 4 

grams per day (4000 milligrams). Taking more than the maximum daily amount may 

cause serious (possibly fatal) liver damage. Get medical help right away if you 

have any of the following symptoms of liver damage: persistent nausea/vomiting, 

extreme tiredness, stomach/abdominal pain, yellowing eyes/skin, dark urine. If 

you have liver problems, consult your doctor or pharmacist for a safe dosage of 

this medication. A very serious allergic reaction to this drug is rare. However

, get medical help right away if you notice any symptoms of a serious allergic 

reaction, including: rash, itching/swelling (especially of the face/tongue/

throat), severe dizziness, trouble breathing. This is not a complete list of 

possible side effects. If you notice other effects not listed above, contact 

your doctor or pharmacist.





DRUG INTERACTIONS: Your healthcare professionals (e.g., doctor or pharmacist) 

may already be aware of any possible drug interactions and may be monitoring 

you for it. Do not start, stop or change the dosage of any medicine before 

checking with them first. This drug should not be used with the following 

medications because very serious interactions may occur: cidofovir, ketorolac. 

If you are currently using any of these medications listed above, tell your 

doctor or pharmacist before starting ibuprofen. Before using this medication, 

tell your doctor or pharmacist of all prescription and nonprescription/herbal 

products you may use, especially of: anti-platelet drugs (e.g., cilostazol, 

clopidogrel), oral bisphosphonates (e.g., alendronate), other medications for 

arthritis (e.g., aspirin, methotrexate), "blood thinners" (e.g., enoxaparin, 

heparin, warfarin), corticosteroids (e.g., prednisone), cyclosporine, 

desmopressin, high blood pressure drugs (including ACE inhibitors such as 

captopril, angiotensin II receptor antagonists such as losartan, and beta-

blockers such as metoprolol), lithium, pemetrexed, "water pills" (diuretics 

such as furosemide, hydrochlorothiazide, triamterene). Check all prescription 

and nonprescription medicine labels carefully for other pain/fever drugs (

NSAIDs such as aspirin, celecoxib, naproxen). These drugs are similar to 

ibuprofen, so taking one of these drugs while also taking ibuprofen may 

increase your risk of side effects. Consult your doctor or pharmacist for more 

details. However, if your doctor has prescribed low doses of aspirin to prevent 

heart attack or stroke (usually at dosages of  milligrams a day), you 

should continue to take the aspirin. Daily use of ibuprofen may decrease aspirin

's ability to prevent heart attack/stroke. Talk to your doctor about using a 

different medication (e.g., acetaminophen) to treat pain/fever. If you must 

take ibuprofen, talk to your doctor about possibly taking immediate-release 

aspirin (not enteric-coated) while also taking the ibuprofen dose apart from 

your aspirin dose. Do not increase your daily dose of aspirin or change the way 

you take aspirin/other medications without your doctor's approval. This 

document does not contain all possible interactions. Therefore, before using 

this product, tell your doctor or pharmacist of all the products you use. Keep 

a list of all your medications with you, and share the list with your doctor 

and pharmacist. 





Sepsis Event Note





- Evaluation


Sepsis Screening Result: No Definite Risk





- Focused Exam


Vital Signs: 


 Vital Signs











  Temp Pulse Resp BP Pulse Ox


 


 03/04/20 18:40  36.1 C  71  16  114/80  100


 


 03/04/20 17:44  36.3 C  131 H  18  143/103 H  99











Date Exam was Performed: 03/04/20


Time Exam was Performed: 19:51